# Patient Record
Sex: FEMALE | Race: WHITE
[De-identification: names, ages, dates, MRNs, and addresses within clinical notes are randomized per-mention and may not be internally consistent; named-entity substitution may affect disease eponyms.]

---

## 2019-01-03 ENCOUNTER — HOSPITAL ENCOUNTER (OUTPATIENT)
Dept: HOSPITAL 35 - CAT | Age: 44
End: 2019-01-03
Attending: ORTHOPAEDIC SURGERY
Payer: COMMERCIAL

## 2019-01-03 DIAGNOSIS — M25.851: ICD-10-CM

## 2019-01-03 DIAGNOSIS — M25.751: ICD-10-CM

## 2019-01-03 DIAGNOSIS — M16.11: Primary | ICD-10-CM

## 2019-01-29 ENCOUNTER — HOSPITAL ENCOUNTER (OUTPATIENT)
Dept: HOSPITAL 35 - OR | Age: 44
Discharge: HOME | End: 2019-01-29
Attending: ORTHOPAEDIC SURGERY
Payer: COMMERCIAL

## 2019-01-29 VITALS — SYSTOLIC BLOOD PRESSURE: 109 MMHG | DIASTOLIC BLOOD PRESSURE: 59 MMHG

## 2019-01-29 VITALS — HEIGHT: 65 IN | BODY MASS INDEX: 19.49 KG/M2 | WEIGHT: 117 LBS

## 2019-01-29 DIAGNOSIS — Z87.442: ICD-10-CM

## 2019-01-29 DIAGNOSIS — F41.9: ICD-10-CM
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
## 2019-01-29 DIAGNOSIS — S73.101A: Primary | ICD-10-CM
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
## 2019-01-29 DIAGNOSIS — Z98.890: ICD-10-CM

## 2019-01-29 DIAGNOSIS — Z85.820: ICD-10-CM

## 2019-01-29 DIAGNOSIS — M25.851: ICD-10-CM
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
## 2019-01-29 DIAGNOSIS — K21.9: ICD-10-CM

## 2019-01-29 DIAGNOSIS — Z79.899: ICD-10-CM

## 2019-01-29 PROCEDURE — 56527: CPT

## 2019-01-29 PROCEDURE — 51538: CPT

## 2019-01-29 PROCEDURE — 56524: CPT

## 2019-01-29 PROCEDURE — 50010 RENAL EXPLORATION: CPT

## 2019-01-29 PROCEDURE — 52282 CYSTOSCOPY IMPLANT STENT: CPT

## 2019-01-29 PROCEDURE — 52001 CYSTO W/IRRG&EVAC MLT CLOTS: CPT

## 2019-01-29 PROCEDURE — 51320: CPT

## 2019-01-29 PROCEDURE — 57092: CPT

## 2019-01-29 PROCEDURE — 50386 REMOVE STENT VIA TRANSURETH: CPT

## 2019-01-30 NOTE — O
[address]   [id number]                     OPERATIVE REPORT              
_______________________________________________________________________________
 
Name:       ZIYAD REAL        Room #:                     DEP [facility]..#:      [id number]                       Account #:      [id number]  
Admission:  01/29/19    Attend Phys:    Miguelangel Pineda MD 
Discharge:  01/29/19    Date of Birth:  [birth year]  
                                                          Report #: [id number]
                                                                    [id number]   
_______________________________________________________________________________
THIS REPORT FOR:   //name//                          
 
CC: FAM unknown
    Miguelangel Pineda
 
DATE OF SERVICE:  01/29/2019
 
 
SERVICE:  Orthopedics.
 
FACILITY:  [facility].
 
SURGEON:  Miguelangel Pineda MD
 
ASSISTANT:  Maria Elena Patterson NP
INDICATION: Extremity positioning, suture management, assistance with repair.
 
PREOPERATIVE DIAGNOSES:
1.  Right hip pain.
2.  Right hip impingement.
3.  Right hip labral tear.
 
POSTOPERATIVE DIAGNOSES:
1.  Right hip pain.
2.  Right hip impingement.
3.  Right hip labral tear.
 
PROCEDURE:
1.  Right hip arthroscopic labral repair.
2.  Right hip arthroscopic extraarticular subspine acetabuloplasty.
3.  Right hip arthroscopic Cam osteochondroplasty.
 
COMPLICATIONS:  None.
 
DRAINS:  None.
 
SPECIMENS:  None.
 
ANESTHESIA:  General with regional.
 
FINDINGS:
1.  A frayed labrum anteriorly as well as laterally and treated with debridement
with the labral repair with Margaret CinchLock suture anchor x 2.
2.  Focal prominent subspine, which was the source of the crossover sign and
small distal Cam deformity treated with resection.
 
 
 
 
[address]   [address]                     OPERATIVE REPORT              
_______________________________________________________________________________
 
Name:       ZIYAD REAL        Room #:                     DEP [facility]#:      [id number]                       Account #:      [id number]  
Admission:  01/29/19    Attend Phys:    Miguelangel Pineda MD 
Discharge:  01/29/19    Date of Birth:  [birth year]  
                                                          Report #: [id number]
                                                                    [id number]   
_______________________________________________________________________________
HISTORY:  The patient is a 43-year-old female with a history of right hip pain
since 02/2018.  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
and has had pain and problems since she had an MRI, which was consistent with
labral tear and she had physical exam and history consistent with symptomatic
KACEY and labral tear as well.  She failed conservative measures, which included
rest, physical therapy, activity modifications, injections and oral medications
all without sufficient relief.  She; therefore, elected to undergo definitive
surgical treatment.  Risks, benefits, alternatives and indication of the surgery
discussed with her in detail.  Risks include but not limited to pain, bleeding,
infection, injury to nerves, blood vessels, persistent pain despite surgical
intervention, failure of any repairs, reconstructions, progression of
preexisting chondral injury, stiffness, need for further surgery as well as
complications related to anesthesia such as stroke, heart attack, pulmonary
complications, thromboembolic disease and death.  We discussed risk of chondral
pathology complications as well in terms of long-term consequences for the hip.
 
PROCEDURE IN DETAIL:  After right lower extremity was correctly identified, the
patient with placement of a single shot regional nerve blockade, she was then
taken to the operating room where general anesthesia was induced without
complication.  She was padded  appropriately.  Prophylactic antibiotics were
administered at appropriate time.  Right hip femoral head and neck junction was
mapped out under fluoroscopy.  She was noted to have a distal Cam deformity at
approximately the 20-50 degree range, which had a maximal alpha angle of
approximately 57 or 58 degrees.  The right hip was then prepped and draped in
standard sterile fashion and a time-out procedure was performed.
 
Standard traction was applied to the right lower extremity.  Total traction time
was 52 minutes.  A standard anterolateral viewing portal was established
followed by mid anterior portal and a transverse capsulotomy was performed. 
There was significant amount of erythema and capsulitis within the hip and
synovitis and this was treated with debridement.  The articular cartilage was
intact on the femoral head and on the acetabulum, although there was some
limited chondral labral junction injury.  The majority of the pathology was
rather frayed focal area of labral tearing anterior superiorly.  The labrum was
mobile in this area.  The capsule was reflected off the dorsal side of the
labrum allowing access to the acetabular rim and the anterior column subspine
region adjacent to the anterior inferior iliac spine.  The bur was used to
perform a subspine recession, which addressed the extraarticular pincer
component of her impingement and then the bur was used to abrade the acetabular
rim to freshen it up for labral refixation.  The first anchor was then placed
just medial to the labral pathology with the cerclage suture, which provided
good compression after the frayed edges were debrided with the cautery and
shaver.  A second anchor was placed more laterally, which provided good stable
fixation as well.  There was some undulation of the acetabular rim, which was
unique to her particular anatomy, but there was no evidence of a chondral wave
sign present during arthroscopy.  The lateral portion of the labrum was frayed
 
 
 
[address]   [address]                     OPERATIVE REPORT              
_______________________________________________________________________________
 
Name:       ZIYAD REAL        Room #:                     DEP [facility] 
M.R.#:      [id number]                       Account #:      [id number]  
Admission:  01/29/19    Attend Phys:    Miguelangel Pineda MD 
Discharge:  01/29/19    Date of Birth:  [birth year]  
                                                          Report #: [id number]
                                                                    [id number]   
_______________________________________________________________________________
as well and this was treated with limited debridement and did not require any
repair more laterally.
 
Traction was let down and then the hip was flexed up.  Attention was turned
towards peripheral compartment.  The transverse capsulotomy was extended down
the neck in a T-shaped to allow access to the distal Cam deformity.  Then, the
bur was used to perform the Cam osteoplasty in typical fashion.  Bony debris was
lavaged.  The C-arm was brought back and the instruments were removed from the
hip.  The Cam osteoplasty was assessed and found to be adequate.  The
instruments were then placed back in the hip.  The bony debris was lavaged out
of the hip and then the T-shaped capsulotomy was closed with a total of four #2
Vicryl sutures.  Instruments were then removed.  Portal sites closed and sterile
dressing was applied.  The patient was awakened from anesthesia and taken to the
recovery room in stable condition.  No complications.  All counts were correct.
 
 
 
 
 
 
 
 
 
 
 
 
 
 
 
 
 
 
 
 
 
 
 
 
 
 
 
 
 
 
  <ELECTRONICALLY SIGNED>
   By: Miguelangel Pineda MD         
  01/30/19     0931
D: 01/29/19 2046                           _____________________________________
T: 01/29/19 2118                           Miguelangel Pineda MD           /nt